# Patient Record
Sex: FEMALE | Race: BLACK OR AFRICAN AMERICAN | Employment: FULL TIME | URBAN - METROPOLITAN AREA
[De-identification: names, ages, dates, MRNs, and addresses within clinical notes are randomized per-mention and may not be internally consistent; named-entity substitution may affect disease eponyms.]

---

## 2017-03-01 ENCOUNTER — OFFICE VISIT (OUTPATIENT)
Dept: FAMILY MEDICINE CLINIC | Age: 42
End: 2017-03-01

## 2017-03-01 VITALS
RESPIRATION RATE: 18 BRPM | BODY MASS INDEX: 33.46 KG/M2 | WEIGHT: 196 LBS | TEMPERATURE: 97.5 F | SYSTOLIC BLOOD PRESSURE: 126 MMHG | OXYGEN SATURATION: 98 % | DIASTOLIC BLOOD PRESSURE: 86 MMHG | HEIGHT: 64 IN | HEART RATE: 71 BPM

## 2017-03-01 DIAGNOSIS — S93.402A SPRAIN OF LEFT ANKLE, UNSPECIFIED LIGAMENT, INITIAL ENCOUNTER: Primary | ICD-10-CM

## 2017-03-01 DIAGNOSIS — L30.9 ECZEMA, UNSPECIFIED TYPE: ICD-10-CM

## 2017-03-01 RX ORDER — HYDROCORTISONE 25 MG/G
CREAM TOPICAL 2 TIMES DAILY
Qty: 30 G | Refills: 0 | Status: SHIPPED | OUTPATIENT
Start: 2017-03-01 | End: 2017-03-11

## 2017-03-01 NOTE — PROGRESS NOTES
Subjective:      Xavi Morocho is a 42 y.o. female seen for evaluation and treatment of a left ankle injury. This is evaluated as a personal injury. The injury was sustained 1 week ago, and occurred while working out with a . She rolled her ankle. She has continued to work and do normal activities since that time. She did not hear or sense a pop or snap at the time of the injury. The patient notes pain and mild swelling since the injury. She also requests a cream for her eczema. Past Medical History:   Diagnosis Date    Cancer [facility])     right breast CA DCIS    Other ill-defined conditions(799.89)     Conjunctivits left eye-on eye gtts 1more wk    Other ill-defined conditions(799.89) 2010    Breast Biopsy    Other ill-defined conditions(799.89) 2010    Breast Biopsy     Past Surgical History:   Procedure Laterality Date    ABDOMEN SURGERY PROC UNLISTED          BREAST SURGERY PROCEDURE UNLISTED      RECONSTRUCTION    HX BREAST BIOPSY      VU.  HX  SECTION      HX DILATION AND CURETTAGE      HX GYN      hysterectomy partial    HX MASTECTOMY  2010    VU    HX TUBAL LIGATION       No Known Allergies        Objective:     Visit Vitals    /86    Pulse 71    Temp 97.5 °F (36.4 °C) (Oral)    Resp 18    Ht 5' 4\" (1.626 m)    Wt 196 lb (88.9 kg)    SpO2 98%    BMI 33.64 kg/m2      Appearance: alert, well appearing, and in no distress. Musculoskeletal exam: Left ankle: no joint tenderness or deformity. Mild swelling to lateral malleolus area, non-tender, no ecchymosis. No muscular tenderness noted, full range of motion without pain. Skin: mild dermatitis to left antecubital and abdomen    Imaging  Offered, pt declined    Assessment/Plan:       ICD-10-CM ICD-9-CM    1. Sprain of left ankle, unspecified ligament, initial encounter S93.402A 845.00    2.  Eczema, unspecified type L30.9 692.9      Orders Placed This Encounter    hydrocortisone (HYTONE) 2.5 % topical cream     Sig: Apply  to affected area two (2) times a day. use thin layer     Dispense:  30 g     Refill:  0     The patient is advised to rest, apply cold or ice intermittently, elevate affected extremity, continue ACE bandage/use ankle support, gradually reintroduce usual activities and return PRN if symptoms persist or worsen.        Blair Jade, NP

## 2017-03-01 NOTE — MR AVS SNAPSHOT
Visit Information Date & Time Provider Department Dept. Phone Encounter #  
 3/1/2017 11:45 AM Moris Kulkarni NP [facility] [phone] [id number] Upcoming Health Maintenance Date Due Pneumococcal 19-64 Highest Risk (1 of 3 - PCV13) 4/1/1993 DTaP/Tdap/Td series (1 - Tdap) 4/1/1995 PAP AKA CERVICAL CYTOLOGY 4/1/1995 INFLUENZA AGE 9 TO ADULT 8/1/2016 Allergies as of 3/1/2017  Review Complete On: 3/1/2017 By: Moris Kulkarni NP No Known Allergies Current Immunizations  Never Reviewed No immunizations on file. Not reviewed this visit You Were Diagnosed With   
  
 Codes Comments Sprain of left ankle, unspecified ligament, initial encounter    -  Primary ICD-10-CM: N54.425G ICD-9-CM: 845.00 Eczema, unspecified type     ICD-10-CM: L30.9 ICD-9-CM: 692.9 Vitals BP  
  
  
  
  
  
 126/86 BMI and BSA Data Body Mass Index Body Surface Area  
 33.64 kg/m 2 2 m 2 Preferred Pharmacy Pharmacy Name Phone [id number], [address] [place] [address] [phone] Your Updated Medication List  
  
   
This list is accurate as of: 3/1/17 12:12 PM.  Always use your most recent med list.  
  
  
  
  
 hydrocortisone 2.5 % topical cream  
Commonly known as:  HYTONE Apply  to affected area two (2) times a day. use thin layer  
  
 multivitamin tablet Commonly known as:  ONE A DAY Take 1 Tab by mouth daily. Prescriptions Sent to Pharmacy Refills  
 hydrocortisone (HYTONE) 2.5 % topical cream 0 Sig: Apply  to affected area two (2) times a day. use thin layer Class: Normal  
 Pharmacy: [address] [place] [address] #: [phone] Route: Topical  
  
Patient Instructions Ankle Sprain: Care Instructions Your Care Instructions An ankle sprain can happen when you twist your ankle.  The ligaments that support the ankle can get stretched and torn. Often the ankle is swollen and painful. Ankle sprains may take from several weeks to several months to heal. Usually, the more pain and swelling you have, the more severe your ankle sprain is and the longer it will take to heal. You can heal faster and regain strength in your ankle with good home treatment. It is very important to give your ankle time to heal completely, so that you do not easily hurt your ankle again. Follow-up care is a key part of your treatment and safety. Be sure to make and go to all appointments, and call your doctor if you are having problems. It's also a good idea to know your test results and keep a list of the medicines you take. How can you care for yourself at home? · Prop up your foot on pillows as much as possible for the next 3 days. Try to keep your ankle above the level of your heart. This will help reduce the swelling. · Follow your doctor's directions for wearing a splint or elastic bandage. Wrapping the ankle may help reduce or prevent swelling. · Your doctor may give you a splint, a brace, an air stirrup, or another form of ankle support to protect your ankle until it is healed. Wear it as directed while your ankle is healing. Do not remove it unless your doctor tells you to. After your ankle has healed, ask your doctor whether you should wear the brace when you exercise. · Put ice or cold packs on your injured ankle for 10 to 20 minutes at a time. Try to do this every 1 to 2 hours for the next 3 days (when you are awake) or until the swelling goes down. Put a thin cloth between the ice and your skin. · You may need to use crutches until you can walk without pain. If you do use crutches, try to bear some weight on your injured ankle if you can do so without pain. This helps the ankle heal. 
· Take pain medicines exactly as directed. ¨ If the doctor gave you a prescription medicine for pain, take it as prescribed. ¨ If you are not taking a prescription pain medicine, ask your doctor if you can take an over-the-counter medicine. · If you have been given ankle exercises to do at home, do them exactly as instructed. These can promote healing and help prevent lasting weakness. When should you call for help? Call your doctor now or seek immediate medical care if: 
· Your pain is getting worse. · Your swelling is getting worse. · Your splint feels too tight or you are unable to loosen it. Watch closely for changes in your health, and be sure to contact your doctor if: 
· You are not getting better after 1 week. Where can you learn more? Go to http://guillermo-clemencia.info/. Enter K816 in the search box to learn more about \"Ankle Sprain: Care Instructions. \" Current as of: May 23, 2016 Content Version: 11.1 © 1785-3796 [employer]. Care instructions adapted under license by [employer] (which disclaims liability or warranty for this information). If you have questions about a medical condition or this instruction, always ask your healthcare professional. [address] any warranty or liability for your use of this information. Introducing [facility] & HEALTH SERVICES! Dear Walter Horowitz: Thank you for requesting a Hexaformer account. Our records indicate that you already have an active Hexaformer account. You can access your account anytime at [email or web address] Did you know that you can access your hospital and ER discharge instructions at any time in Hexaformer? You can also review all of your test results from your hospital stay or ER visit. Additional Information If you have questions, please visit the Frequently Asked Questions section of the Hexaformer website at [email or web address] Remember, Hexaformer is NOT to be used for urgent needs. For medical emergencies, dial 911. Now available from your iPhone and Android! Please provide this summary of care documentation to your next provider. Your primary care clinician is listed as Pete Hurley. If you have any questions after today's visit, please call [phone].

## 2017-03-01 NOTE — PATIENT INSTRUCTIONS
Ankle Sprain: Care Instructions  Your Care Instructions    An ankle sprain can happen when you twist your ankle. The ligaments that support the ankle can get stretched and torn. Often the ankle is swollen and painful. Ankle sprains may take from several weeks to several months to heal. Usually, the more pain and swelling you have, the more severe your ankle sprain is and the longer it will take to heal. You can heal faster and regain strength in your ankle with good home treatment. It is very important to give your ankle time to heal completely, so that you do not easily hurt your ankle again. Follow-up care is a key part of your treatment and safety. Be sure to make and go to all appointments, and call your doctor if you are having problems. It's also a good idea to know your test results and keep a list of the medicines you take. How can you care for yourself at home? · Prop up your foot on pillows as much as possible for the next 3 days. Try to keep your ankle above the level of your heart. This will help reduce the swelling. · Follow your doctor's directions for wearing a splint or elastic bandage. Wrapping the ankle may help reduce or prevent swelling. · Your doctor may give you a splint, a brace, an air stirrup, or another form of ankle support to protect your ankle until it is healed. Wear it as directed while your ankle is healing. Do not remove it unless your doctor tells you to. After your ankle has healed, ask your doctor whether you should wear the brace when you exercise. · Put ice or cold packs on your injured ankle for 10 to 20 minutes at a time. Try to do this every 1 to 2 hours for the next 3 days (when you are awake) or until the swelling goes down. Put a thin cloth between the ice and your skin. · You may need to use crutches until you can walk without pain. If you do use crutches, try to bear some weight on your injured ankle if you can do so without pain.  This helps the ankle heal.  · Take pain medicines exactly as directed. ¨ If the doctor gave you a prescription medicine for pain, take it as prescribed. ¨ If you are not taking a prescription pain medicine, ask your doctor if you can take an over-the-counter medicine. · If you have been given ankle exercises to do at home, do them exactly as instructed. These can promote healing and help prevent lasting weakness. When should you call for help? Call your doctor now or seek immediate medical care if:  · Your pain is getting worse. · Your swelling is getting worse. · Your splint feels too tight or you are unable to loosen it. Watch closely for changes in your health, and be sure to contact your doctor if:  · You are not getting better after 1 week. Where can you learn more? Go to http://guillermo-clemencia.info/. Enter B796 in the search box to learn more about \"Ankle Sprain: Care Instructions. \"  Current as of: May 23, 2016  Content Version: 11.1  © 4987-6206 [employer]. Care instructions adapted under license by [employer] (which disclaims liability or warranty for this information). If you have questions about a medical condition or this instruction, always ask your healthcare professional. [place] [address] any warranty or liability for your use of this information.

## 2017-03-11 ENCOUNTER — HOSPITAL ENCOUNTER (EMERGENCY)
Age: 42
Discharge: HOME | End: 2017-03-11
Attending: EMERGENCY MEDICINE
Payer: COMMERCIAL

## 2017-03-11 ENCOUNTER — APPOINTMENT (OUTPATIENT)
Dept: GENERAL RADIOLOGY | Age: 42
End: 2017-03-11
Attending: PHYSICIAN ASSISTANT
Payer: COMMERCIAL

## 2017-03-11 VITALS
DIASTOLIC BLOOD PRESSURE: 83 MMHG | TEMPERATURE: 97.8 F | HEART RATE: 81 BPM | RESPIRATION RATE: 18 BRPM | SYSTOLIC BLOOD PRESSURE: 156 MMHG | OXYGEN SATURATION: 100 %

## 2017-03-11 DIAGNOSIS — S82.839A CLOSED FRACTURE OF DISTAL END OF FIBULA, UNSPECIFIED FRACTURE MORPHOLOGY, INITIAL ENCOUNTER: Primary | ICD-10-CM

## 2017-03-11 PROCEDURE — 99283 EMERGENCY DEPT VISIT LOW MDM: CPT

## 2017-03-11 PROCEDURE — 73610 X-RAY EXAM OF ANKLE: CPT

## 2017-03-11 RX ORDER — OXYCODONE AND ACETAMINOPHEN 5; 325 MG/1; MG/1
1 TABLET ORAL
Qty: 12 TAB | Refills: 0 | Status: SHIPPED | OUTPATIENT
Start: 2017-03-11 | End: 2018-04-28

## 2017-03-11 RX ORDER — CEPHALEXIN 500 MG/1
500 CAPSULE ORAL 3 TIMES DAILY
Qty: 15 CAP | Refills: 0 | Status: SHIPPED | OUTPATIENT
Start: 2017-03-11 | End: 2017-03-11 | Stop reason: CLARIF

## 2017-03-11 RX ORDER — OXYCODONE AND ACETAMINOPHEN 5; 325 MG/1; MG/1
1 TABLET ORAL
Qty: 12 TAB | Refills: 0 | Status: SHIPPED | OUTPATIENT
Start: 2017-03-11 | End: 2017-03-11 | Stop reason: CLARIF

## 2017-03-11 NOTE — ED PROVIDER NOTES
HPI Comments: Gloria Cintron is a 42 y.o. female with no pertinent PMHx presenting ambulatory to the ED c/o 7/10 left ankle pain s/p injury just PTA in the ED. Pt states that she twisted her left ankle while walking up a driveway, which exacerbated a sprain to the same ankle ~3 weeks ago. Pt denies any increase of her pain with weight bearing. Pt denies taking any medications for her pain PTA in the ED. Pt denies any chance of pregnancy. Pt denies any recent fevers/chills or N/V/D. Of note, pt states that a ride will present themselves in the ED to take her home. PCP: Kee Benitez MD  Social Hx: - tobacco use, - alcohol use, - illicit drug use    There are no other complaints, changes, or physical findings at this time. The history is provided by the patient. No  was used. Past Medical History:   Diagnosis Date    Cancer [facility])     right breast CA DCIS    Other ill-defined conditions(799.89)     Conjunctivits left eye-on eye gtts 1more wk    Other ill-defined conditions(799.89) 2010    Breast Biopsy    Other ill-defined conditions(799.89) 2010    Breast Biopsy       Past Surgical History:   Procedure Laterality Date    ABDOMEN SURGERY PROC UNLISTED          BREAST SURGERY PROCEDURE UNLISTED      RECONSTRUCTION    HX BREAST BIOPSY      VU.  HX  SECTION  2002    HX DILATION AND CURETTAGE  2004    HX GYN      hysterectomy partial    HX MASTECTOMY  2010    VU    HX TUBAL LIGATION           Family History:   Problem Relation Age of Onset    Other Mother      JAMES PALSY    Hypertension Maternal Grandmother     Kidney Disease Maternal Grandmother        Social History     Social History    Marital status:      Spouse name: N/A    Number of children: N/A    Years of education: N/A     Occupational History    Not on file.      Social History Main Topics    Smoking status: Never Smoker    Smokeless tobacco: Never Used    Alcohol use No    Drug use: No    Sexual activity: Not on file     Other Topics Concern    Not on file     Social History Narrative         ALLERGIES: Review of patient's allergies indicates no known allergies. Review of Systems   Constitutional: Negative. Negative for chills and fever. HENT: Negative. Eyes: Negative. Respiratory: Negative. Negative for cough, shortness of breath and wheezing. Cardiovascular: Negative. Negative for chest pain. Gastrointestinal: Negative. Negative for abdominal pain, diarrhea, nausea and vomiting. Genitourinary: Negative. Negative for difficulty urinating, dysuria and vaginal pain. Musculoskeletal: Positive for arthralgias (left ankle). Skin: Negative. Neurological: Negative. Psychiatric/Behavioral: Negative. All other systems reviewed and are negative. Vitals:    03/11/17 1223   BP: 156/83   Pulse: 81   Resp: 18   Temp: 97.8 °F (36.6 °C)   SpO2: 100%            Physical Exam   Constitutional: She is oriented to person, place, and time. She appears well-developed and well-nourished. No distress. HENT:   Head: Normocephalic and atraumatic. Right Ear: External ear normal.   Left Ear: External ear normal.   Nose: Nose normal.   Mouth/Throat: Oropharynx is clear and moist. No oropharyngeal exudate. Eyes: Conjunctivae and EOM are normal. Pupils are equal, round, and reactive to light. Right eye exhibits no discharge. Left eye exhibits no discharge. No scleral icterus. Neck: Normal range of motion. Neck supple. No JVD present. No tracheal deviation present. Cardiovascular: Normal rate, regular rhythm, normal heart sounds and intact distal pulses. Exam reveals no gallop and no friction rub. No murmur heard. Pulmonary/Chest: Effort normal and breath sounds normal. No respiratory distress. She has no wheezes. She has no rales. She exhibits no tenderness. Abdominal: Soft. Bowel sounds are normal. She exhibits no distension and no mass.  There is no tenderness. There is no rebound and no guarding. Musculoskeletal: Normal range of motion. She exhibits tenderness. She exhibits no edema. LEFT ANKLE  Swollen and TTP  Good active/passive ROM  Able to ambulate without difficulty   Lymphadenopathy:     She has no cervical adenopathy. Neurological: She is alert and oriented to person, place, and time. She has normal reflexes. No cranial nerve deficit. She exhibits normal muscle tone. Coordination normal.   Skin: Skin is warm and dry. She is not diaphoretic. Psychiatric: She has a normal mood and affect. Her behavior is normal. Judgment and thought content normal.   Nursing note and vitals reviewed. MDM  Number of Diagnoses or Management Options  Closed fracture of distal end of fibula, unspecified fracture morphology, initial encounter:   Diagnosis management comments: DDx: sprain, strain, fracture       Amount and/or Complexity of Data Reviewed  Tests in the radiology section of CPT®: ordered and reviewed  Review and summarize past medical records: yes  Discuss the patient with other providers: yes (Orthopaedics)  Independent visualization of images, tracings, or specimens: yes    Patient Progress  Patient progress: stable    ED Course       Procedures  Consult Note:  1:10 PM  ERIC Street spoke with Chelsey Kaurelor,  Specialty: Orthopaedics  Discussed pt's hx, disposition, and available diagnostic and imaging results. Reviewed care plans. Consultant agrees with plans as outlined. ERIC Woods advises an air splint cast and crutches. Written by Lestine Ip Prudence Nyhan, ED Scribe, as dictated by Danita Nance. IMAGING RESULTS:  XR ANKLE LT MIN 3 V   Final Result     EXAM: XR ANKLE LT MIN 3 V     INDICATION: pain after twisting injury.     COMPARISON: None.     FINDINGS: Three views of the left ankle demonstrate mild soft tissue swelling  laterally in the ankle with demonstration of a small ankle effusion.  There is a  minimally displaced fracture at the inferior aspect of lateral malleolus which  is transversely oriented. No other fracture is shown. Ankle mortise width is  symmetric. Hindfoot alignment is anatomic. A small to moderate plantar calcaneal  spur is incidentally shown.     IMPRESSION  IMPRESSION: Transverse fracture of the distal aspect of the lateral malleolus. IMPRESSION:  1. Closed fracture of distal end of fibula, unspecified fracture morphology, initial encounter        PLAN:  1. Current Discharge Medication List      START taking these medications    Details   oxyCODONE-acetaminophen (PERCOCET) 5-325 mg per tablet Take 1 Tab by mouth every six (6) hours as needed for Pain. Max Daily Amount: 4 Tabs. Qty: 12 Tab, Refills: 0           2. Follow-up Information     Follow up With Details Comments [address] Alvarez MD In 2 days As needed, For wound re-check, For suture removal 10 days [address] [facility] [address]  [address] [phone]  [address]  [address] [facility]      Diedra Osgood, MD In 3 days for follow up [address] [facility]  [address] [facility] [address]  [address] [phone]          Return to ED if worse   DISCHARGE NOTE:  1:12 PM  The patient is ready for discharge. The patient's signs, symptoms, diagnosis, and discharge instructions have been discussed and the patient and/or family has conveyed their understanding. The patient and/or family is to follow up as recommended or return to the ER should their symptoms worsen. Plan has been discussed and the patient and/or family is in agreement. Written by Angelina Pyle [facility], ED Scribe, as dictated by Jose Campos. Attestation: This note is prepared by Lenora Bowles. [facility], acting as Scribe for Jose Campos. ERIC Lewis: The scribe's documentation has been prepared under my direction and personally reviewed by me in its entirety.  I confirm that the note above accurately reflects all work, treatment, procedures, and medical decision making performed by me.

## 2017-03-11 NOTE — ED NOTES
MARCIANO Lewis reviewed discharge instructions with the patient. The patient verbalized understanding and was taken out of ER via w/c.

## 2017-03-11 NOTE — LETTER
[address] 
[facility] EMERGENCY DEPT 
[address] [place] [address] [address] [phone] 
[phone] Work/School Note Date: 3/11/2017 To Whom It May concern: 
 
My Chung was seen and treated today in the emergency room by the following provider(s): 
Attending Provider: Ramya Mike MD 
Physician Assistant: MARCIANO Vaz. My Chung No work 3 days. Sincerely, MARCIANO Vaz

## 2017-03-11 NOTE — ED NOTES
Pt presents with c/o left ankle pain. Pt stated that she \"twisted\" her ankle this AM while walking.

## 2018-01-03 ENCOUNTER — OFFICE VISIT (OUTPATIENT)
Dept: FAMILY MEDICINE CLINIC | Age: 43
End: 2018-01-03

## 2018-01-03 VITALS
HEART RATE: 71 BPM | OXYGEN SATURATION: 100 % | HEIGHT: 64 IN | WEIGHT: 196.4 LBS | DIASTOLIC BLOOD PRESSURE: 95 MMHG | SYSTOLIC BLOOD PRESSURE: 153 MMHG | TEMPERATURE: 98.5 F | BODY MASS INDEX: 33.53 KG/M2 | RESPIRATION RATE: 20 BRPM

## 2018-01-03 DIAGNOSIS — R03.0 ELEVATED BLOOD PRESSURE READING: ICD-10-CM

## 2018-01-03 DIAGNOSIS — L20.9 ATOPIC DERMATITIS, UNSPECIFIED TYPE: Primary | ICD-10-CM

## 2018-01-03 RX ORDER — HYDROCORTISONE 25 MG/G
CREAM TOPICAL 2 TIMES DAILY
Qty: 30 G | Refills: 0 | Status: SHIPPED | OUTPATIENT
Start: 2018-01-03

## 2018-01-03 NOTE — PROGRESS NOTES
HISTORY OF PRESENT ILLNESS  Lizeth Brown is a 36 y.o. female. HPI  Pt c/o itchy rash to arms, torso, and back. She commonly gets similar rash this time of year. She was seen here in 3/2017 for atopic dermatitis and was prescribed Hytone cream which cleared the rash well. She's using cetaphil lotion to the rash. Past Medical History:   Diagnosis Date    Cancer [facility])     right breast CA DCIS    Other ill-defined conditions(799.89)     Conjunctivits left eye-on eye gtts 1more wk    Other ill-defined conditions(799.89) 2010    Breast Biopsy    Other ill-defined conditions(799.89) 2010    Breast Biopsy     Past Surgical History:   Procedure Laterality Date    ABDOMEN SURGERY PROC UNLISTED          BREAST SURGERY PROCEDURE UNLISTED      RECONSTRUCTION    HX BREAST BIOPSY      VU.  HX  SECTION      HX DILATION AND CURETTAGE      HX GYN      hysterectomy partial    HX MASTECTOMY  2010    VU    HX TUBAL LIGATION       No Known Allergies      Review of Systems   Skin: Positive for itching and rash. All other systems reviewed and are negative. Physical Exam   Constitutional: She appears well-developed and well-nourished. No distress. Skin:   Papular rash to left forearm. Patchy, rough, dry skin w/ fine scale noted to left torso and back. ASSESSMENT and PLAN    ICD-10-CM ICD-9-CM    1. Atopic dermatitis, unspecified type L20.9 691.8    2. Elevated blood pressure reading R03.0 796.2    BP noted. Recommend she recheck BP in 3-5 days, f/u with PCP if remains elevated. Hytone cream to [facility], use as directed. Discussed routine skin care. F/u prn. Camila Weaver, NP  This note will not be viewable in [address]

## 2018-01-03 NOTE — PATIENT INSTRUCTIONS
Atopic Dermatitis: Care Instructions  Your Care Instructions  Atopic dermatitis (also called eczema) is a skin problem that causes intense itching and a red, raised rash. In severe cases, the rash develops clear fluid-filled blisters. The rash is not contagious. People with this condition seem to have very sensitive immune systems that are likely to react to things that cause allergies. The immune system is the body's way of fighting infection. There is no cure for atopic dermatitis, but you may be able to control it with care at home. Follow-up care is a key part of your treatment and safety. Be sure to make and go to all appointments, and call your doctor if you are having problems. It's also a good idea to know your test results and keep a list of the medicines you take. How can you care for yourself at home? · Use moisturizer at least twice a day. · If your doctor prescribes a cream, use it as directed. If your doctor prescribes other medicine, take it exactly as directed. · Wash the affected area with water only. Soap can make dryness and itching worse. Pat dry. · Apply a moisturizer after bathing. Use a cream such as Lubriderm, Moisturel, or Cetaphil that does not irritate the skin or cause a rash. Apply the cream while your skin is still damp after lightly drying with a towel. · Use cold, wet cloths to reduce itching. · Keep cool, and stay out of the sun. · If itching affects your normal activities, an over-the-counter antihistamine, such as diphenhydramine (Benadryl) or loratadine (Claritin) may help. Read and follow all instructions on the label. When should you call for help? Call your doctor now or seek immediate medical care if:  ? · Your rash gets worse and you have a fever. ? · You have new blisters or bruises, or the rash spreads and looks like a sunburn. ? · You have signs of infection, such as:  ¨ Increased pain, swelling, warmth, or redness.   ¨ Red streaks leading from the rash.  ¨ Pus draining from the rash. ¨ A fever. ? · You have crusting or oozing sores. ? · You have joint aches or body aches along with your rash. ? Watch closely for changes in your health, and be sure to contact your doctor if:  ? · Your rash does not clear up after 2 to 3 weeks of home treatment. ? · Itching interferes with your sleep or daily activities. Where can you learn more? Go to http://guillermo-clemencia.info/. Enter K238 in the search box to learn more about \"Atopic Dermatitis: Care Instructions. \"  Current as of: October 13, 2016  Content Version: 11.4  © 1494-1059 [employer]. Care instructions adapted under license by [employer] (which disclaims liability or warranty for this information). If you have questions about a medical condition or this instruction, always ask your healthcare professional. [place] [address] any warranty or liability for your use of this information.

## 2018-04-05 ENCOUNTER — OFFICE VISIT (OUTPATIENT)
Dept: FAMILY MEDICINE CLINIC | Age: 43
End: 2018-04-05

## 2018-04-05 VITALS
HEIGHT: 64 IN | OXYGEN SATURATION: 97 % | HEART RATE: 92 BPM | BODY MASS INDEX: 33.2 KG/M2 | DIASTOLIC BLOOD PRESSURE: 97 MMHG | SYSTOLIC BLOOD PRESSURE: 124 MMHG | WEIGHT: 194.5 LBS | RESPIRATION RATE: 16 BRPM | TEMPERATURE: 97.3 F

## 2018-04-05 DIAGNOSIS — N30.01 ACUTE CYSTITIS WITH HEMATURIA: Primary | ICD-10-CM

## 2018-04-05 DIAGNOSIS — L42 PITYRIASIS ROSEA: ICD-10-CM

## 2018-04-05 LAB
BILIRUB UR QL STRIP: NEGATIVE
GLUCOSE UR-MCNC: NEGATIVE MG/DL
KETONES P FAST UR STRIP-MCNC: NEGATIVE MG/DL
PH UR STRIP: 7.5 [PH] (ref 4.6–8)
PROT UR QL STRIP: NEGATIVE
SP GR UR STRIP: 1.01 (ref 1–1.03)
UA UROBILINOGEN AMB POC: NORMAL (ref 0.2–1)
URINALYSIS CLARITY POC: NORMAL
URINALYSIS COLOR POC: YELLOW
URINE BLOOD POC: NORMAL
URINE LEUKOCYTES POC: NORMAL
URINE NITRITES POC: NEGATIVE

## 2018-04-05 RX ORDER — PHENTERMINE HYDROCHLORIDE 37.5 MG/1
TABLET ORAL
Refills: 0 | COMMUNITY
Start: 2018-03-17

## 2018-04-05 RX ORDER — CIPROFLOXACIN 500 MG/1
500 TABLET ORAL 2 TIMES DAILY
Qty: 14 TAB | Refills: 0 | Status: SHIPPED | OUTPATIENT
Start: 2018-04-05 | End: 2018-04-12

## 2018-04-05 RX ORDER — HYDROCORTISONE 25 MG/G
CREAM TOPICAL
COMMUNITY
Start: 2018-01-03 | End: 2018-04-28

## 2018-04-05 NOTE — PROGRESS NOTES
Chief Complaint   Patient presents with    Urinary Frequency     Frequency of urination, ezcema on bilateral arms     Results for orders placed or performed in visit on 04/05/18   AMB POC URINALYSIS DIP STICK AUTO W/O MICRO     Status: None   Result Value Ref Range Status    Color (UA POC) Yellow  Final    Clarity (UA POC) Slightly Cloudy  Final    Glucose (UA POC) Negative Negative Final    Bilirubin (UA POC) Negative Negative Final    Ketones (UA POC) Negative Negative Final    Specific gravity (UA POC) 1.015 1.001 - 1.035 Final    Blood (UA POC) 1+ Negative Final    pH (UA POC) 7.5 4.6 - 8.0 Final    Protein (UA POC) Negative Negative Final    Urobilinogen (UA POC) 1 mg/dL 0.2 - 1 Final    Nitrites (UA POC) Negative Negative Final    Leukocyte esterase (UA POC) 3+ Negative Final

## 2018-04-05 NOTE — MR AVS SNAPSHOT
[address] [id number] [facility], [facility] [address] [address] 
[phone] Patient: Sami Masterson MRN: [id number] IGI:[id number] Visit Information Date & Time Provider Department Dept. Phone Encounter #  
 4/5/2018  2:00 PM Kirill Strong, [address] [phone] [id number] Upcoming Health Maintenance Date Due Pneumococcal 19-64 Highest Risk (1 of 3 - PCV13) 4/1/1993 DTaP/Tdap/Td series (1 - Tdap) 4/1/1995 PAP AKA CERVICAL CYTOLOGY 4/1/1995 Allergies as of 4/5/2018  Review Complete On: 4/5/2018 By: Vince Moritz, LPN No Known Allergies Current Immunizations  Never Reviewed No immunizations on file. Not reviewed this visit You Were Diagnosed With   
  
 Codes Comments Acute cystitis with hematuria    -  Primary ICD-10-CM: N30.01 
ICD-9-CM: 595.0 Pityriasis rosea     ICD-10-CM: L42 
ICD-9-CM: 696.3 Vitals BP Pulse Temp Resp Height(growth percentile) Weight(growth percentile) (!) 124/97 92 97.3 °F (36.3 °C) (Oral) 16 5' 4\" (1.626 m) 194 lb 8 oz (88.2 kg) SpO2 BMI OB Status Smoking Status 97% 33.39 kg/m2 Hysterectomy Never Smoker Vitals History BMI and BSA Data Body Mass Index Body Surface Area  
 33.39 kg/m 2 2 m 2 Preferred Pharmacy Pharmacy Name Phone [id number] [address] [phone] Your Updated Medication List  
  
   
This list is accurate as of 4/5/18  2:34 PM.  Always use your most recent med list.  
  
  
  
  
 ciprofloxacin HCl 500 mg tablet Commonly known as:  CIPRO Take 1 Tab by mouth two (2) times a day for 7 days. * hydrocortisone 2.5 % topical cream  
Commonly known as:  HYTONE Apply  to affected area two (2) times a day. use thin layer * hydrocortisone 2.5 % topical cream  
Commonly known as:  HYTONE  
  
 multivitamin tablet Commonly known as:  ONE A DAY  
 Take 1 Tab by mouth daily. oxyCODONE-acetaminophen 5-325 mg per tablet Commonly known as:  PERCOCET Take 1 Tab by mouth every six (6) hours as needed for Pain. Max Daily Amount: 4 Tabs. phentermine 37.5 mg tablet Commonly known as:  ADIPEX-P  
TAKE 1/2 TABLET BY MOUTH TWICE A DAY AS NEEDED * Notice: This list has 2 medication(s) that are the same as other medications prescribed for you. Read the directions carefully, and ask your doctor or other care provider to review them with you. Prescriptions Sent to Pharmacy Refills  
 ciprofloxacin HCl (CIPRO) 500 mg tablet 0 Sig: Take 1 Tab by mouth two (2) times a day for 7 days. Class: Normal  
 Pharmacy: [address] [place] [address] #: [phone] Route: Oral  
  
We Performed the Following AMB POC URINALYSIS DIP STICK AUTO W/O MICRO [60933 CPT(R)] CULTURE, URINE [id number] CPT(R)] Patient Instructions Pityriasis Rosea: Care Instructions Your Care Instructions Pityriasis rosea (say \"pit-uh-RY-uh-arianna JASON-zee-uh\") is a common skin rash. It usually starts as one scaly, reddish-pink spot on your stomach or back. Days or weeks later, more spots appear. The rash may itch, but it will not spread to other people. No one knows what causes pityriasis rosea. Some doctors believe it is a reaction to a virus. Pityriasis rosea is most common in children and young adults. It lasts 1 to 3 months and then goes away on its own. Medicine can help relieve any itching. Follow-up care is a key part of your treatment and safety. Be sure to make and go to all appointments, and call your doctor if you are having problems. It's also a good idea to know your test results and keep a list of the medicines you take. How can you care for yourself at home? · Use your medicine exactly as prescribed. Call your doctor if you have any problems with your medicine. · Expose your skin to small amounts of sunlight, but avoid sunburn. Sunlight can lessen the rash. · Use a mild soap, such as Dove or Cetaphil, when you wash your skin. · Add a handful of oatmeal (ground to a powder) to your bath. Or you can try an oatmeal bath product, such as Aveeno. Keep the water warm or lukewarm. A hot bath or shower may make the rash more visible and itchy. · Use an over-the-counter 1% hydrocortisone cream for small itchy areas. When should you call for help? Call your doctor now or seek immediate medical care if: 
? · You have signs of infection such as: 
¨ Pain, warmth, or swelling near the rash. ¨ Red streaks near the rash. ¨ Pus coming from the rash. ¨ A fever. ? Watch closely for changes in your health, and be sure to contact your doctor if: 
? · You see the rash on the palms of your hands or the soles of your feet. ? · You do not get better as expected. Where can you learn more? Go to http://guillermo-clemencia.info/. Enter S327 in the search box to learn more about \"Pityriasis Rosea: Care Instructions. \" Current as of: October 13, 2016 Content Version: 11.4 © 9856-1587 [employer]ect. Care instructions adapted under license by [employer] (which disclaims liability or warranty for this information). If you have questions about a medical condition or this instruction, always ask your healthcare professional. [place] [address] any warranty or liability for your use of this information. Introducing [facility]! Dear Breanna Manzano: Thank you for requesting a Tweetworks account. Our records indicate that you already have an active Tweetworks account. You can access your account anytime at [email or web address] Did you know that you can access your hospital and ER discharge instructions at any time in Tweetworks? You can also review all of your test results from your hospital stay or ER visit. Additional Information If you have questions, please visit the Frequently Asked Questions section of the Proofpointhart website at [email or web address] Remember, [employer] is NOT to be used for urgent needs. For medical emergencies, dial 911. Now available from your iPhone and Android! Please provide this summary of care documentation to your next provider. Your primary care clinician is listed as Alberta Murray. If you have any questions after today's visit, please call [phone].

## 2018-04-05 NOTE — PROGRESS NOTES
Celesitno Choe is a 39 y.o. female who complains of dysuria, frequency, urgency for 5 days. Patient denies flank pain, vomiting, fever, unusual vaginal discharge. Patient does not have a history of recurrent UTI. Patient does not have a history of pyelonephritis. She is also having a nonitching rash of the entire body, looks like eczema but not that, steroid cream without improvement. Noticed last week. admits that she has been stressed. Review of Systems  Pertinent items are noted in HPI. Objective:     Visit Vitals    BP (!) 124/97    Pulse 92    Temp 97.3 °F (36.3 °C) (Oral)    Resp 16    Ht 5' 4\" (1.626 m)    Wt 194 lb 8 oz (88.2 kg)    SpO2 97%    BMI 33.39 kg/m2     General:  alert, cooperative, no distress   Abdomen: soft, nontender, nondistended, no masses or organomegaly. Back:  CVA tenderness absent   :  defer exam    SKIN:      Plaque type rash of the entire body excluding the face/arms; no excoriations or tracking  Laboratory:   Urine dipstick shows positive for RBC's and positive for leukocytes. - urine culture sent out  Micro exam: not done. Assessment/Plan:     Acute cystitis     1. ciprofloxacin  2. Maintain adequate hydration  3. May use OTC pyridium as desired, which will turn urine orange/red color  4. Follow up if symptoms not improving, and prn. Encounter Diagnoses   Name Primary?  Acute cystitis with hematuria Yes    Pityriasis rosea      Orders Placed This Encounter    CULTURE, URINE    AMB POC URINALYSIS DIP STICK AUTO W/O MICRO    ciprofloxacin HCl (CIPRO) 500 mg tablet   . Given cipro 500mg bid x 7 days  Urine culture sent out  Given h/o on rosea    I have discussed the diagnosis with the patient and the intended plan as seen in the above orders. The patient has received an after-visit summary and questions were answered concerning future plans. Patient conveyed understanding of the plan at the time of the visit.     Yosef Perez, MSN, ANP 4/5/2018

## 2018-04-05 NOTE — PATIENT INSTRUCTIONS
Pityriasis Rosea: Care Instructions  Your Care Instructions    Pityriasis rosea (say \"pit-uh-RY-uh-arianna JASON-zee-uh\") is a common skin rash. It usually starts as one scaly, reddish-pink spot on your stomach or back. Days or weeks later, more spots appear. The rash may itch, but it will not spread to other people. No one knows what causes pityriasis rosea. Some doctors believe it is a reaction to a virus. Pityriasis rosea is most common in children and young adults. It lasts 1 to 3 months and then goes away on its own. Medicine can help relieve any itching. Follow-up care is a key part of your treatment and safety. Be sure to make and go to all appointments, and call your doctor if you are having problems. It's also a good idea to know your test results and keep a list of the medicines you take. How can you care for yourself at home? · Use your medicine exactly as prescribed. Call your doctor if you have any problems with your medicine. · Expose your skin to small amounts of sunlight, but avoid sunburn. Sunlight can lessen the rash. · Use a mild soap, such as Dove or Cetaphil, when you wash your skin. · Add a handful of oatmeal (ground to a powder) to your bath. Or you can try an oatmeal bath product, such as Aveeno. Keep the water warm or lukewarm. A hot bath or shower may make the rash more visible and itchy. · Use an over-the-counter 1% hydrocortisone cream for small itchy areas. When should you call for help? Call your doctor now or seek immediate medical care if:  ? · You have signs of infection such as:  ¨ Pain, warmth, or swelling near the rash. ¨ Red streaks near the rash. ¨ Pus coming from the rash. ¨ A fever. ? Watch closely for changes in your health, and be sure to contact your doctor if:  ? · You see the rash on the palms of your hands or the soles of your feet. ? · You do not get better as expected. Where can you learn more?   Go to http://guillermo-clemencia.info/. Enter S327 in the search box to learn more about \"Pityriasis Rosea: Care Instructions. \"  Current as of: October 13, 2016  Content Version: 11.4  © 2779-3085 [employer]wise[employer]. Care instructions adapted under license by [employer] (which disclaims liability or warranty for this information). If you have questions about a medical condition or this instruction, always ask your healthcare professional. [address] any warranty or liability for your use of this information.

## 2018-04-07 LAB — BACTERIA UR CULT: NORMAL

## 2018-04-28 ENCOUNTER — APPOINTMENT (OUTPATIENT)
Dept: GENERAL RADIOLOGY | Age: 43
End: 2018-04-28
Attending: EMERGENCY MEDICINE
Payer: OTHER MISCELLANEOUS

## 2018-04-28 ENCOUNTER — HOSPITAL ENCOUNTER (EMERGENCY)
Age: 43
Discharge: HOME | End: 2018-04-28
Attending: EMERGENCY MEDICINE
Payer: OTHER MISCELLANEOUS

## 2018-04-28 VITALS
TEMPERATURE: 98.2 F | BODY MASS INDEX: 31.92 KG/M2 | HEART RATE: 76 BPM | RESPIRATION RATE: 18 BRPM | SYSTOLIC BLOOD PRESSURE: 137 MMHG | HEIGHT: 64 IN | OXYGEN SATURATION: 100 % | DIASTOLIC BLOOD PRESSURE: 82 MMHG | WEIGHT: 187 LBS

## 2018-04-28 DIAGNOSIS — S82.891A CLOSED FRACTURE OF RIGHT ANKLE, INITIAL ENCOUNTER: Primary | ICD-10-CM

## 2018-04-28 PROCEDURE — 99282 EMERGENCY DEPT VISIT SF MDM: CPT

## 2018-04-28 PROCEDURE — 73610 X-RAY EXAM OF ANKLE: CPT

## 2018-04-28 RX ORDER — NAPROXEN 500 MG/1
500 TABLET ORAL 2 TIMES DAILY WITH MEALS
Qty: 30 TAB | Refills: 0 | Status: SHIPPED | OUTPATIENT
Start: 2018-04-28 | End: 2018-06-25 | Stop reason: ALTCHOICE

## 2018-04-28 NOTE — ED TRIAGE NOTES
Pt was leaving work and slipped on wet floor, rolling her left ankle. Denies falling to ground. Denies hitting head. No LOC. Arrives in wheelchair with ice pack to left ankle.  States her left knee hit the floor

## 2018-04-28 NOTE — DISCHARGE INSTRUCTIONS
Broken Ankle: Care Instructions  Your Care Instructions    An ankle may break (fracture) during sports, a fall, or other accidents. Fractures can range from a small, hairline crack, to a bone or bones broken into two or more pieces. Your treatment depends on how bad the break is. Your doctor may have put your ankle in a splint or cast to allow it to heal or to keep it stable until you see another doctor. It may take weeks or months for your ankle to heal. You can help your ankle heal with some care at home. You heal best when you take good care of yourself. Eat a variety of healthy foods, and don't smoke. You may have had a sedative to help you relax. You may be unsteady after having sedation. It can take a few hours for the medicine's effects to wear off. Common side effects of sedation include nausea, vomiting, and feeling sleepy or tired. The doctor has checked you carefully, but problems can develop later. If you notice any problems or new symptoms,  get medical treatment right away. Follow-up care is a key part of your treatment and safety. Be sure to make and go to all appointments, and call your doctor if you are having problems. It's also a good idea to know your test results and keep a list of the medicines you take. How can you care for yourself at home? · If the doctor gave you a sedative:  ¨ For 24 hours, don't do anything that requires attention to detail. It takes time for the medicine's effects to completely wear off. ¨ For your safety, do not drive or operate any machinery that could be dangerous. Wait until the medicine wears off and you can think clearly and react easily. · Put ice or a cold pack on your ankle for 10 to 20 minutes at a time. Try to do this every 1 to 2 hours for the next 3 days (when you are awake). Put a thin cloth between the ice and your cast or splint. Keep your cast or splint dry. · Follow the cast care instructions your doctor gives you.  If you have a splint, do not take it off unless your doctor tells you to. · Be safe with medicines. Take pain medicines exactly as directed. ¨ If the doctor gave you a prescription medicine for pain, take it as prescribed. ¨ If you are not taking a prescription pain medicine, ask your doctor if you can take an over-the-counter medicine. · Prop up your leg on pillows in the first few days after the injury. Keep the ankle higher than the level of your heart. This will help reduce swelling. · Do not put weight on your ankle unless your doctor tells you to. Use crutches to walk. · Follow instructions for exercises to keep your leg strong. · Wiggle your toes often to reduce swelling and stiffness. When should you call for help? Call 911 anytime you think you may need emergency care. For example, call if:  ? · You have chest pain, are short of breath, or you cough up blood. ? · You are very sleepy and you have trouble waking up. ?Call your doctor now or seek immediate medical care if:  ? · You have new or worse nausea or vomiting. ? · You have new or worse pain. ? · Your foot is cool or pale or changes color. ? · You have tingling, weakness, or numbness in your toes. ? · Your cast or splint feels too tight. ? · You have signs of a blood clot in your leg (called a deep vein thrombosis), such as:  ¨ Pain in your calf, back of the knee, thigh, or groin. ¨ Redness or swelling in your leg. ? Watch closely for changes in your health, and be sure to contact your doctor if:  ? · You have a problem with your splint or cast.   ? · You do not get better as expected. Where can you learn more? Go to http://guillermo-clemencia.info/. Enter P763 in the search box to learn more about \"Broken Ankle: Care Instructions. \"  Current as of: March 21, 2017  Content Version: 11.4  © 2913-3151 [employer].  Care instructions adapted under license by [employer] (which disclaims liability or warranty for this information). If you have questions about a medical condition or this instruction, always ask your healthcare professional. [place] [address] any warranty or liability for your use of this information.

## 2018-04-28 NOTE — LETTER
[address] 
[address] [facility] [id number] 
[phone] Work/School Note Date: 4/28/2018 To Whom It May concern: 
 
He Juanita was seen and treated today in the emergency room by the following provider(s): 
Attending Provider: Adán Castro MD.   
 
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

## 2018-04-28 NOTE — PROGRESS NOTES
Prior to Admission Medications   Prescriptions Last Dose Informant Patient Reported? Taking?   hydrocortisone (HYTONE) 2.5 % topical cream 3/28/2018 at Unknown time  No Yes   Sig: Apply  to affected area two (2) times a day. use thin layer   multivitamin (ONE A DAY) tablet 4/27/2018 at 2100  Yes Yes   Sig: Take 1 Tab by mouth daily. phentermine (ADIPEX-P) 37.5 mg tablet 4/28/2018 at 0300  Yes Yes   Sig: TAKE 1/2 TABLET BY MOUTH TWICE A DAY AS NEEDED      Facility-Administered Medications: None   Self: List updated per patient interview. Percocet removed. Denies use of any other medications, supplements, drops, creams or patches.

## 2018-04-28 NOTE — ED PROVIDER NOTES
HPI Comments: 39 y.o. female with past medical history significant for conjunctivitis of left eye, breast biopsy and right breast CA who presents from work with chief complaint of ankle pain. Per pt, she was leaving work this morning when she slipped on the wet floor. The pt reports that she twisted her left ankle during the fall, however did not hit her head or become syncopal. Since twisting her left ankle, the pt notes that she has experiencing constant pain which is exacerbated with ROM. Per pt, she has been able to weight bare on her LLE, however does so with some soreness. She rates her current pain /10. The pt makes it known that she has hx of prior injury to her left ankle. Pt denies fever, chills, N/V/D, CP, SOB, abd pain, dizziness, headache, syncope, dizziness, numbness and wound. There are no other acute medical concerns at this time. Social hx: Non-smoker, No current ETOH consumption    PCP: Becka Hernadez MD    Note written by Xander Lua, as dictated by Yannick Jaquez MD 8:37 AM          The history is provided by the patient. No  was used. Past Medical History:   Diagnosis Date    Cancer [facility])     right breast CA DCIS    Other ill-defined conditions(799.89)     Conjunctivits left eye-on eye gtts 1more wk    Other ill-defined conditions(799.89) 2010    Breast Biopsy    Other ill-defined conditions(799.89) 2010    Breast Biopsy       Past Surgical History:   Procedure Laterality Date    ABDOMEN SURGERY PROC UNLISTED          BREAST SURGERY PROCEDURE UNLISTED      RECONSTRUCTION    HX BREAST BIOPSY      VU.     HX  SECTION      HX DILATION AND CURETTAGE  2004    HX GYN      hysterectomy partial    HX MASTECTOMY  2010    VU    HX TUBAL LIGATION           Family History:   Problem Relation Age of Onset    Other Mother      JAMES PALSY    Hypertension Maternal Grandmother     Kidney Disease Maternal Grandmother Social History     Social History    Marital status:      Spouse name: N/A    Number of children: N/A    Years of education: N/A     Occupational History    Not on file. Social History Main Topics    Smoking status: Never Smoker    Smokeless tobacco: Never Used    Alcohol use No    Drug use: No    Sexual activity: Not on file     Other Topics Concern    Not on file     Social History Narrative         ALLERGIES: Review of patient's allergies indicates no known allergies. Review of Systems   Constitutional: Negative for chills and fever. Respiratory: Negative for cough and shortness of breath. Cardiovascular: Negative for chest pain. Gastrointestinal: Negative for abdominal pain, diarrhea, nausea and vomiting. Musculoskeletal: Positive for arthralgias (left ankle ). Negative for back pain, myalgias and neck pain. Neurological: Negative for dizziness, syncope, numbness and headaches. All other systems reviewed and are negative. There were no vitals filed for this visit. Physical Exam   Musculoskeletal:        Left ankle: She exhibits decreased range of motion, swelling and ecchymosis. She exhibits no deformity, no laceration and normal pulse. Tenderness. Lateral malleolus tenderness found. No medial malleolus, no AITFL, no CF ligament, no posterior TFL, no head of 5th metatarsal and no proximal fibula tenderness found. Achilles tendon normal.        Feet:    Nursing note and vitals reviewed. CONSTITUTIONAL: Well-appearing; well-nourished; in no apparent distress  HEAD: Normocephalic; atraumatic  EYES: PERRL; EOM intact; conjunctiva and sclera are clear bilaterally. ENT: No rhinorrhea; normal pharynx with no tonsillar hypertrophy; mucous membranes pink/moist, no erythema, no exudate. NECK: Supple; non-tender; no cervical lymphadenopathy  CARD: Normal S1, S2; no murmurs, rubs, or gallops. Regular rate and rhythm.   RESP: Normal respiratory effort; breath sounds clear and equal bilaterally; no wheezes, rhonchi, or rales. ABD: Normal bowel sounds; non-distended; non-tender; no palpable organomegaly, no masses, no bruits. Back Exam: Normal inspection; no vertebral point tenderness, no CVA tenderness. Normal range of motion. SKIN: Warm; dry; no rash. NEURO:Alert and oriented x 3, coherent, JOCY-XII grossly intact, sensory and motor are non-focal.        MDM  Number of Diagnoses or Management Options  Closed fracture of right ankle, initial encounter:   Diagnosis management comments: Assessment: 78-year-old female with left ankle injury rule out fracture    Plan: x-ray of the left ankle/ analgesia/ education and reassurance/ Monitor and Reevaluate. Amount and/or Complexity of Data Reviewed  Clinical lab tests: ordered and reviewed  Tests in the radiology section of CPT®: ordered and reviewed  Tests in the medicine section of CPT®: reviewed and ordered  Discussion of test results with the performing providers: yes  Decide to obtain previous medical records or to obtain history from someone other than the patient: yes  Obtain history from someone other than the patient: yes  Review and summarize past medical records: yes  Discuss the patient with other providers: yes  Independent visualization of images, tracings, or specimens: yes    Risk of Complications, Morbidity, and/or Mortality  Presenting problems: moderate  Diagnostic procedures: moderate  Management options: moderate          ED Course       Procedures     XRAY INTERPRETATION (ED MD)  Xray of Left ankle shows shows acute left distal fibula fracture; STS;No subluxation/dislocation. John Nguyen MD 8:40 AM      PROGRESS NOTE:  8:47 AM  Pt has been informed of her x-ray imaging which shows new nondisplaced fracture of the distal fibular tip. She is in understanding at this time. The pt informs that she is currently scheduled to follow up with her Orthopedist Dr. Mouna Pérez on Tuesday (4/24/2018). Progress Note:   Pt has been reexamined by Cris Plummer MD. Pt is feeling much better. Symptoms have improved. All available results have been reviewed with pt and any available family. Pt understands sx, dx, and tx in ED. Care plan has been outlined and questions have been answered. Pt is ready to go home. Will send home on Left ankle fracture instructions. Prescription of naproxen. Rest/ Ice/ Compression/ elevation. Outpatient referral with Ortho for reevaluation and further treatment as needed. Written by Cris Plummer MD,8:54 AM    .   .

## 2018-06-25 ENCOUNTER — OFFICE VISIT (OUTPATIENT)
Dept: FAMILY MEDICINE CLINIC | Age: 43
End: 2018-06-25

## 2018-06-25 VITALS
HEART RATE: 75 BPM | SYSTOLIC BLOOD PRESSURE: 133 MMHG | OXYGEN SATURATION: 99 % | TEMPERATURE: 96.4 F | RESPIRATION RATE: 16 BRPM | DIASTOLIC BLOOD PRESSURE: 91 MMHG | WEIGHT: 191.2 LBS | HEIGHT: 64 IN | BODY MASS INDEX: 32.64 KG/M2

## 2018-06-25 DIAGNOSIS — B37.31 VAGINAL CANDIDA: ICD-10-CM

## 2018-06-25 DIAGNOSIS — R39.15 URGENCY OF URINATION: ICD-10-CM

## 2018-06-25 DIAGNOSIS — N30.01 ACUTE CYSTITIS WITH HEMATURIA: Primary | ICD-10-CM

## 2018-06-25 LAB
BILIRUB UR QL STRIP: NEGATIVE
GLUCOSE UR-MCNC: NEGATIVE MG/DL
KETONES P FAST UR STRIP-MCNC: NEGATIVE MG/DL
PH UR STRIP: 7 [PH] (ref 4.6–8)
PROT UR QL STRIP: NORMAL
SP GR UR STRIP: 1.03 (ref 1–1.03)
UA UROBILINOGEN AMB POC: NORMAL (ref 0.2–1)
URINALYSIS CLARITY POC: NORMAL
URINALYSIS COLOR POC: NORMAL
URINE BLOOD POC: NORMAL
URINE LEUKOCYTES POC: NORMAL
URINE NITRITES POC: NEGATIVE

## 2018-06-25 RX ORDER — FLUCONAZOLE 150 MG/1
150 TABLET ORAL DAILY
Qty: 1 TAB | Refills: 0 | Status: SHIPPED | OUTPATIENT
Start: 2018-06-25 | End: 2018-06-26

## 2018-06-25 RX ORDER — SULFAMETHOXAZOLE AND TRIMETHOPRIM 800; 160 MG/1; MG/1
1 TABLET ORAL 2 TIMES DAILY
Qty: 14 TAB | Refills: 0 | Status: SHIPPED | OUTPATIENT
Start: 2018-06-25 | End: 2018-07-02

## 2018-06-25 RX ORDER — PHENTERMINE HYDROCHLORIDE 37.5 MG/1
TABLET ORAL
COMMUNITY
Start: 2018-03-17 | End: 2018-06-25 | Stop reason: ALTCHOICE

## 2018-06-25 NOTE — PROGRESS NOTES
Subjective:     Sami Masterson is a 39 y.o. female who complains of dysuria, frequency, urgency for 2 days. Patient denies flank pain, vomiting, fever, unusual vaginal discharge. Patient does not have a history of recurrent UTI. Patient does not have a history of pyelonephritis. 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 14 Tab 0    fluconazole (DIFLUCAN) 150 mg tablet Take 1 Tab by mouth daily for 1 day. FDA advises cautious prescribing of oral fluconazole in pregnancy. 1 Tab 0    phentermine (ADIPEX-P) 37.5 mg tablet TAKE 1/2 TABLET BY MOUTH TWICE A DAY AS NEEDED  0    hydrocortisone (HYTONE) 2.5 % topical cream Apply  to affected area two (2) times a day. use thin layer 30 g 0    multivitamin (ONE A DAY) tablet Take 1 Tab by mouth daily. No Known Allergies  Past Medical History:   Diagnosis Date    Cancer [facility])     right breast CA DCIS    Other ill-defined conditions(799.89)     Conjunctivits left eye-on eye gtts 1more wk    Other ill-defined conditions(799.89) 2010    Breast Biopsy    Other ill-defined conditions(799.89) 2010    Breast Biopsy     Past Surgical History:   Procedure Laterality Date    ABDOMEN SURGERY PROC UNLISTED          BREAST SURGERY PROCEDURE UNLISTED      RECONSTRUCTION    HX BREAST BIOPSY      VU.     HX  SECTION      HX DILATION AND CURETTAGE  2004    HX GYN      hysterectomy partial    HX MASTECTOMY  11/2010    VU    HX TUBAL LIGATION  2005     Family History   Problem Relation Age of Onset    Other Mother      JAMES PALSY    Hypertension Maternal Grandmother     Kidney Disease Maternal Grandmother      Social History   Substance Use Topics    Smoking status: Never Smoker    Smokeless tobacco: Never Used    Alcohol use No        Review of Systems  Pertinent items are noted in HPI. Objective:     Visit Vitals    BP (!) 133/91    Pulse 75    Temp 96.4 °F (35.8 °C) (Oral)    Resp 16    Ht 5' 4\" (1.626 m)    Wt 191 lb 3.2 oz (86.7 kg)    SpO2 99%    BMI 32.82 kg/m2     General:  alert, cooperative, no distress   Abdomen: soft, nontender, nondistended, no masses or organomegaly. Back:  CVA tenderness absent   :  defer exam     Laboratory:   Urine dipstick shows positive for RBC's, positive for protein and positive for leukocytes. Micro exam: not done. Sent to the lab. Assessment/Plan:     UTI     1. TMP/SMX  2. Maintain adequate hydration  3. May use OTC pyridium as desired, which will turn urine orange/red color  4. Follow up if symptoms not improving, and prn. ICD-10-CM ICD-9-CM    1. Acute cystitis with hematuria N30.01 595.0 trimethoprim-sulfamethoxazole (BACTRIM DS, SEPTRA DS) 160-800 mg per tablet   2. Urgency of urination R39.15 788.63 AMB POC URINALYSIS DIP STICK AUTO W/O MICRO      CULTURE, URINE   3. Vaginal candida B37.3 112.1 fluconazole (DIFLUCAN) 150 mg tablet   . Discussed elevated blood pressure, patient states she is very stressed out and has been up for 48 hours working 2 long night shifts. I have advised her to recheck it after she has rested, and started the treatment for UTI, also pushing fluids. She is in agreement.

## 2018-06-25 NOTE — PROGRESS NOTES
Chief Complaint   Patient presents with    Urgency     Urgency and feeling funny after urinating since yesterday     Results for orders placed or performed in visit on 06/25/18   AMB POC URINALYSIS DIP STICK AUTO W/O MICRO     Status: None   Result Value Ref Range Status    Color (UA POC) Dark Yellow  Final    Clarity (UA POC) Cloudy  Final    Glucose (UA POC) Negative Negative Final    Bilirubin (UA POC) Negative Negative Final    Ketones (UA POC) Negative Negative Final    Specific gravity (UA POC) 1.030 1.001 - 1.035 Final    Blood (UA POC) 1+ Negative Final    pH (UA POC) 7.0 4.6 - 8.0 Final    Protein (UA POC) 1+ Negative Final    Urobilinogen (UA POC) 0.2 mg/dL 0.2 - 1 Final    Nitrites (UA POC) Negative Negative Final    Leukocyte esterase (UA POC) 1+ Negative Final

## 2018-06-25 NOTE — PATIENT INSTRUCTIONS
Urinary Tract Infection in Female Teens: Care Instructions  Your Care Instructions    A urinary tract infection, or UTI, is a general term for an infection anywhere between the kidneys and the urethra (where urine comes out). Most UTIs are bladder infections. They often cause pain or burning when you urinate. UTIs are caused by bacteria and can be cured with antibiotics. Be sure to complete your treatment so that the infection does not get worse. Follow-up care is a key part of your treatment and safety. Be sure to make and go to all appointments, and call your doctor if you are having problems. It's also a good idea to know your test results and keep a list of the medicines you take. How can you care for yourself at home? · Take your antibiotics as directed. Do not stop taking them just because you feel better. You need to take the full course of antibiotics. · Drink extra water and other fluids for the next day or two. This will help make the urine less concentrated and help wash out the bacteria that are causing the infection. (If you have kidney, heart, or liver disease and have to limit fluids, talk with your doctor before you increase the amount of fluids you drink.)  · Avoid drinks that are carbonated or have caffeine. They can irritate the bladder. · Urinate often. Try to empty your bladder each time. · To relieve pain, take a hot bath or lay a heating pad set on low over your lower belly or genital area. Never go to sleep with a heating pad in place. To prevent UTIs  · Drink plenty of water each day. This helps you urinate often, which clears bacteria from your system. (If you have kidney, heart, or liver disease and have to limit fluids, talk with your doctor before you increase the amount of fluids you drink.)  · Urinate when you need to. · If you are sexually active, urinate right after you have sex. · Change sanitary pads often.   · Avoid douches, bubble baths, feminine hygiene sprays, and other feminine hygiene products that have deodorants. · After going to the bathroom, wipe from front to back. When should you call for help? Call your doctor now or seek immediate medical care if:  ? · Symptoms such as fever, chills, nausea, or vomiting get worse or appear for the first time. ? · You have new pain in your back just below your rib cage. This is called flank pain. ? · There is new blood or pus in your urine. ? · You have any problems with your antibiotic medicine. ? Watch closely for changes in your health, and be sure to contact your doctor if:  ? · You are not getting better after taking an antibiotic for 2 days. ? · Your symptoms go away but then come back. Where can you learn more? Go to http://guillermo-clemencia.info/. Enter B512 in the search box to learn more about \"Urinary Tract Infection in Female Teens: Care Instructions. \"  Current as of: May 12, 2017  Content Version: 11.4  © 3528-4303 [employer]wise[employer]. Care instructions adapted under license by [employer] (which disclaims liability or warranty for this information). If you have questions about a medical condition or this instruction, always ask your healthcare professional. [address] any warranty or liability for your use of this information.

## 2018-06-25 NOTE — MR AVS SNAPSHOT
[facility] 
 
 
 [address] [facility], [facility] 104 [address] [place] [address] 
[phone] Patient: Pari Grijalva MRN: [id number] UOC:[id number] Visit Information Date & Time Provider Department Dept. Phone Encounter #  
 6/25/2018 10:30 AM Tatiana Sanchez NP 1400 [facility] [phone] [id number] Follow-up Instructions Return if symptoms worsen or fail to improve. Upcoming Health Maintenance Date Due Pneumococcal 19-64 Highest Risk (1 of 3 - PCV13) 4/1/1993 DTaP/Tdap/Td series (1 - Tdap) 4/1/1995 PAP AKA CERVICAL CYTOLOGY 4/1/1995 Influenza Age 5 to Adult 8/1/2018 Allergies as of 6/25/2018  Review Complete On: 6/25/2018 By: Danilo Campos LPN No Known Allergies Current Immunizations  Never Reviewed No immunizations on file. Not reviewed this visit You Were Diagnosed With   
  
 Codes Comments Acute cystitis with hematuria    -  Primary ICD-10-CM: N30.01 
ICD-9-CM: 595.0 Urgency of urination     ICD-10-CM: R39.15 ICD-9-CM: 448.00 Vaginal candida     ICD-10-CM: B37.3 ICD-9-CM: 112.1 Vitals BP Pulse Temp Resp Height(growth percentile) Weight(growth percentile) (!) 133/91 75 96.4 °F (35.8 °C) (Oral) 16 5' 4\" (1.626 m) 191 lb 3.2 oz (86.7 kg) SpO2 BMI OB Status Smoking Status 99% 32.82 kg/m2 Hysterectomy Never Smoker Vitals History BMI and BSA Data Body Mass Index Body Surface Area  
 32.82 kg/m 2 1.98 m 2 Preferred Pharmacy Pharmacy Name Phone [id number] [place] [address] [phone] Your Updated Medication List  
  
   
This list is accurate as of 6/25/18 10:51 AM.  Always use your most recent med list.  
  
  
  
  
 fluconazole 150 mg tablet Commonly known as:  DIFLUCAN Take 1 Tab by mouth daily for 1 day. FDA advises cautious prescribing of oral fluconazole in pregnancy. hydrocortisone 2.5 % topical cream  
Commonly known as:  HYTONE Apply  to affected area two (2) times a day. use thin layer  
  
 multivitamin tablet Commonly known as:  ONE A DAY Take 1 Tab by mouth daily. phentermine 37.5 mg tablet Commonly known as:  ADIPEX-P  
TAKE 1/2 TABLET BY MOUTH TWICE A DAY AS NEEDED  
  
 trimethoprim-sulfamethoxazole 160-800 mg per tablet Commonly known as:  BACTRIM DS, SEPTRA DS Take 1 Tab by mouth two (2) times a day for 7 days. Prescriptions Sent to Pharmacy Refills  
 trimethoprim-sulfamethoxazole (BACTRIM DS, SEPTRA DS) 160-800 mg per tablet 0 Sig: Take 1 Tab by mouth two (2) times a day for 7 days. Class: Normal  
 Pharmacy: [address] [place] [address] Ph #: [phone] Route: Oral  
 fluconazole (DIFLUCAN) 150 mg tablet 0 Sig: Take 1 Tab by mouth daily for 1 day. FDA advises cautious prescribing of oral fluconazole in pregnancy. Class: Normal  
 Pharmacy: [address] [place] [address] Ph #: [phone] Route: Oral  
  
We Performed the Following AMB POC URINALYSIS DIP STICK AUTO W/O MICRO [90466 CPT(R)] CULTURE, URINE [id number] CPT(R)] Follow-up Instructions Return if symptoms worsen or fail to improve. Patient Instructions Urinary Tract Infection in Female Teens: Care Instructions Your Care Instructions A urinary tract infection, or UTI, is a general term for an infection anywhere between the kidneys and the urethra (where urine comes out). Most UTIs are bladder infections. They often cause pain or burning when you urinate. UTIs are caused by bacteria and can be cured with antibiotics. Be sure to complete your treatment so that the infection does not get worse. Follow-up care is a key part of your treatment and safety.  Be sure to make and go to all appointments, and call your doctor if you are having problems. It's also a good idea to know your test results and keep a list of the medicines you take. How can you care for yourself at home? · Take your antibiotics as directed. Do not stop taking them just because you feel better. You need to take the full course of antibiotics. · Drink extra water and other fluids for the next day or two. This will help make the urine less concentrated and help wash out the bacteria that are causing the infection. (If you have kidney, heart, or liver disease and have to limit fluids, talk with your doctor before you increase the amount of fluids you drink.) · Avoid drinks that are carbonated or have caffeine. They can irritate the bladder. · Urinate often. Try to empty your bladder each time. · To relieve pain, take a hot bath or lay a heating pad set on low over your lower belly or genital area. Never go to sleep with a heating pad in place. To prevent UTIs · Drink plenty of water each day. This helps you urinate often, which clears bacteria from your system. (If you have kidney, heart, or liver disease and have to limit fluids, talk with your doctor before you increase the amount of fluids you drink.) · Urinate when you need to. · If you are sexually active, urinate right after you have sex. · Change sanitary pads often. · Avoid douches, bubble baths, feminine hygiene sprays, and other feminine hygiene products that have deodorants. · After going to the bathroom, wipe from front to back. When should you call for help? Call your doctor now or seek immediate medical care if: 
? · Symptoms such as fever, chills, nausea, or vomiting get worse or appear for the first time. ? · You have new pain in your back just below your rib cage. This is called flank pain. ? · There is new blood or pus in your urine. ? · You have any problems with your antibiotic medicine. ? Watch closely for changes in your health, and be sure to contact your doctor if: ? · You are not getting better after taking an antibiotic for 2 days. ? · Your symptoms go away but then come back. Where can you learn more? Go to http://guillermo-clemencia.info/. Enter C690 in the search box to learn more about \"Urinary Tract Infection in Female Teens: Care Instructions. \" Current as of: May 12, 2017 Content Version: 11.4 © 6610-8685 [employer]. Care instructions adapted under license by [employer] (which disclaims liability or warranty for this information). If you have questions about a medical condition or this instruction, always ask your healthcare professional. [address] any warranty or liability for your use of this information. Introducing [facility]! Dear Nikko Bender: Thank you for requesting a [employer] account. Our records indicate that you already have an active 2 Minutes account. You can access your account anytime at [email or web address] Did you know that you can access your hospital and ER discharge instructions at any time in [employer]? You can also review all of your test results from your hospital stay or ER visit. Additional Information If you have questions, please visit the Frequently Asked Questions section of the [employer] website at [email or web address] Remember, 2 [employer] is NOT to be used for urgent needs. For medical emergencies, dial 911. Now available from your iPhone and Android! Please provide this summary of care documentation to your next provider. Your primary care clinician is listed as Dar Florence. If you have any questions after today's visit, please call [phone].

## 2018-06-27 LAB — BACTERIA UR CULT: ABNORMAL
